# Patient Record
Sex: MALE | Race: WHITE | NOT HISPANIC OR LATINO | Employment: OTHER | URBAN - METROPOLITAN AREA
[De-identification: names, ages, dates, MRNs, and addresses within clinical notes are randomized per-mention and may not be internally consistent; named-entity substitution may affect disease eponyms.]

---

## 2018-12-11 ENCOUNTER — OFFICE VISIT (OUTPATIENT)
Dept: FAMILY MEDICINE | Facility: CLINIC | Age: 53
End: 2018-12-11
Payer: COMMERCIAL

## 2018-12-11 VITALS
HEART RATE: 60 BPM | WEIGHT: 199 LBS | SYSTOLIC BLOOD PRESSURE: 136 MMHG | TEMPERATURE: 98 F | HEIGHT: 69 IN | OXYGEN SATURATION: 98 % | BODY MASS INDEX: 29.47 KG/M2 | RESPIRATION RATE: 12 BRPM | DIASTOLIC BLOOD PRESSURE: 84 MMHG

## 2018-12-11 DIAGNOSIS — Z12.11 SCREEN FOR COLON CANCER: ICD-10-CM

## 2018-12-11 DIAGNOSIS — R59.0 LYMPHADENOPATHY OF RIGHT CERVICAL REGION: Primary | ICD-10-CM

## 2018-12-11 DIAGNOSIS — Z13.220 LIPID SCREENING: ICD-10-CM

## 2018-12-11 DIAGNOSIS — Z12.5 SCREENING FOR PROSTATE CANCER: ICD-10-CM

## 2018-12-11 DIAGNOSIS — M26.609 TMJ DYSFUNCTION: ICD-10-CM

## 2018-12-11 DIAGNOSIS — Z23 NEED FOR VACCINATION: ICD-10-CM

## 2018-12-11 PROCEDURE — 99214 OFFICE O/P EST MOD 30 MIN: CPT | Mod: ,,, | Performed by: INTERNAL MEDICINE

## 2018-12-11 PROCEDURE — 3008F BODY MASS INDEX DOCD: CPT | Mod: ,,, | Performed by: INTERNAL MEDICINE

## 2018-12-11 RX ORDER — CETIRIZINE HYDROCHLORIDE 10 MG/1
10 TABLET ORAL DAILY PRN
COMMUNITY

## 2018-12-11 RX ORDER — FINASTERIDE 1 MG/1
1 TABLET, FILM COATED ORAL DAILY
COMMUNITY
End: 2020-01-14

## 2018-12-11 NOTE — PROGRESS NOTES
SUBJECTIVE:    Patient ID: Emeetrio Nava is a 53 y.o. male.    Chief Complaint: Mass (right side of neck)    HPI     In for check-says he went hunting and developed some pain in front of his right ear, some ear pain, cracking in ear and jaw which has gone away-no sore throat-no ear sx now-continues with a lymph nose in the right neck-continues to be there-he noted it three weeks ago after his ear and jaw sx started-he noted jaw soreness which made it hard to chew and he would feel the jaw crack-he had no limitation of the mouth aperture-he had no fever-no radiation of pain at the time-he had no sx of anything catching in the laryngeal area-he has been having some sinus issues-sinus pressure headache frontally-had to lay down on a recent hunting trip-has regular intake of zyrtec helps to relieve headaches-if no zyrtec he will get a sinus headache    No visits with results within 6 Month(s) from this visit.   Latest known visit with results is:   Hospital Outpatient Visit on 07/12/2012   Component Date Value Ref Range Status    ISTAT CREATININE 07/12/2012 1.3  0.5 - 1.4 mg/dL Final       Past Medical History:   Diagnosis Date    Thyroid disease     cyst     Social History     Socioeconomic History    Marital status:      Spouse name: Not on file    Number of children: Not on file    Years of education: Not on file    Highest education level: Not on file   Social Needs    Financial resource strain: Not on file    Food insecurity - worry: Not on file    Food insecurity - inability: Not on file    Transportation needs - medical: Not on file    Transportation needs - non-medical: Not on file   Occupational History    Not on file   Tobacco Use    Smoking status: Never Smoker    Smokeless tobacco: Never Used   Substance and Sexual Activity    Alcohol use: Yes     Comment: social    Drug use: No    Sexual activity: Not on file   Other Topics Concern    Not on file   Social History Narrative     "Not on file     Past Surgical History:   Procedure Laterality Date    TONSILLECTOMY       Family History   Problem Relation Age of Onset    Heart disease Mother     Atrial fibrillation Mother     No Known Problems Sister     No Known Problems Brother      Vitals:    12/11/18 1025   BP: 136/84   Pulse: 60   Resp: 12   Temp: 97.8 °F (36.6 °C)   SpO2: 98%   Weight: 90.3 kg (199 lb)   Height: 5' 9" (1.753 m)       Review of Systems   Constitutional: Negative for appetite change, chills, diaphoresis, fatigue, fever and unexpected weight change.   HENT: Negative for congestion, ear pain, hearing loss, nosebleeds, postnasal drip, sinus pressure, sinus pain, sneezing, sore throat, tinnitus and trouble swallowing.         HPI   Eyes: Negative for photophobia, pain and visual disturbance.   Respiratory: Negative for apnea, cough, choking, chest tightness, shortness of breath and wheezing.         Snores   Cardiovascular: Negative for chest pain, palpitations (rarely notices them-checks his BP and it tells him the pulse is irregular at times) and leg swelling.        Evaluation last year-rx was recommended but he preferred not to take and try exercise and diet and sx decreased-excercises without palpitations   Gastrointestinal: Negative for abdominal distention, abdominal pain, blood in stool, constipation, diarrhea, nausea and vomiting.   Endocrine: Negative for cold intolerance, heat intolerance, polydipsia and polyuria.   Genitourinary: Negative for difficulty urinating, dysuria, flank pain, frequency, hematuria and urgency.   Musculoskeletal: Negative for arthralgias, back pain, joint swelling, myalgias, neck pain and neck stiffness.        HPI-no recent pain   Skin: Negative for pallor and rash.   Allergic/Immunologic: Negative for environmental allergies and food allergies.   Neurological: Negative for dizziness, tremors, seizures, syncope, speech difficulty, weakness, light-headedness, numbness and headaches. "   Hematological: Does not bruise/bleed easily.   Psychiatric/Behavioral: Negative for agitation, confusion, decreased concentration, sleep disturbance and suicidal ideas. The patient is not nervous/anxious.           Objective:      Physical Exam   Constitutional: He is oriented to person, place, and time. He appears well-developed and well-nourished. He is cooperative. No distress.   HENT:   Head: Normocephalic and atraumatic.   Nose: Nose normal.   Mouth/Throat: Uvula is midline, oropharynx is clear and moist and mucous membranes are normal.   1/2 cm lymph nose in the middle aspect of the right anterior cervical chain   Eyes: Conjunctivae and EOM are normal. Right pupil is round and reactive. Left pupil is round and reactive.   Neck: Normal range of motion. Neck supple. Carotid bruit is not present. No thyromegaly present.   Cardiovascular: Normal rate, regular rhythm, normal heart sounds and intact distal pulses.   Pulmonary/Chest: Effort normal and breath sounds normal.   Abdominal: Soft. Bowel sounds are normal. He exhibits no mass. There is no hepatosplenomegaly. There is no tenderness. There is no rigidity. No hernia.   Musculoskeletal: Normal range of motion.   Lymphadenopathy:     He has no cervical adenopathy.     He has no axillary adenopathy.   Neurological: He is alert and oriented to person, place, and time.   Skin: Skin is warm and dry. No cyanosis. Nails show no clubbing.   Psychiatric: He has a normal mood and affect. His speech is normal.   Nursing note and vitals reviewed.          Assessment:       1. Lymphadenopathy of right cervical region    2. TMJ dysfunction    3. Screen for colon cancer    4. Need for vaccination    5. Screening for prostate cancer    6. Lipid screening         Plan:           Lymphadenopathy of right cervical region  -     CBC auto differential; Future; Expected date: 12/11/2018  -     Comprehensive metabolic panel; Future; Expected date: 12/11/2018  -     If lymph nose  persists, recheck    TMJ dysfunction         -     Resolved    Screen for colon cancer  -     Cologuard Screening (Multitarget Stool DNA); Future; Expected date: 12/11/2018    Need for vaccination  -     DIPH,PERTUSS,ACEL,,TET VAC,PF,, ADULT, (ADACEL) 2 Lf-(2.5-5-3-5 mcg)-5Lf/0.5 mL injection; Inject 0.5 mLs into the muscle once. for 1 dose  Dispense: 0.5 mL; Refill: 0    Screening for prostate cancer  -     PSA, Screening; Future; Expected date: 12/11/2018    Lipid screening  -     Lipid panel; Future; Expected date: 12/11/2018

## 2019-01-12 LAB
ALBUMIN SERPL-MCNC: 4.3 G/DL (ref 3.5–5.5)
ALBUMIN/GLOB SERPL: 2 {RATIO} (ref 1.2–2.2)
ALP SERPL-CCNC: 50 IU/L (ref 39–117)
ALT SERPL-CCNC: 16 IU/L (ref 0–44)
AST SERPL-CCNC: 21 IU/L (ref 0–40)
BASOPHILS # BLD AUTO: 0 X10E3/UL (ref 0–0.2)
BASOPHILS NFR BLD AUTO: 1 %
BILIRUB SERPL-MCNC: 0.7 MG/DL (ref 0–1.2)
BUN SERPL-MCNC: 19 MG/DL (ref 6–24)
BUN/CREAT SERPL: 19 (ref 9–20)
CALCIUM SERPL-MCNC: 9.4 MG/DL (ref 8.7–10.2)
CHLORIDE SERPL-SCNC: 100 MMOL/L (ref 96–106)
CHOLEST SERPL-MCNC: 210 MG/DL (ref 100–199)
CO2 SERPL-SCNC: 26 MMOL/L (ref 20–29)
CREAT SERPL-MCNC: 0.99 MG/DL (ref 0.76–1.27)
EGFR IF AFRICAN AMERICAN: 100 ML/MIN/1.73
EOSINOPHIL # BLD AUTO: 0.2 X10E3/UL (ref 0–0.4)
EOSINOPHIL NFR BLD AUTO: 3 %
ERYTHROCYTE [DISTWIDTH] IN BLOOD BY AUTOMATED COUNT: 13.7 % (ref 12.3–15.4)
EST. GFR  (NON AFRICAN AMERICAN): 87 ML/MIN/1.73
GLOBULIN SER CALC-MCNC: 2.2 G/DL (ref 1.5–4.5)
GLUCOSE SERPL-MCNC: 90 MG/DL (ref 65–99)
HCT VFR BLD AUTO: 45.1 % (ref 37.5–51)
HDLC SERPL-MCNC: 60 MG/DL
HGB BLD-MCNC: 14.6 G/DL (ref 13–17.7)
IMM GRANULOCYTES # BLD AUTO: 0 X10E3/UL (ref 0–0.1)
IMM GRANULOCYTES NFR BLD AUTO: 0 %
LDLC SERPL CALC-MCNC: 136 MG/DL (ref 0–99)
LYMPHOCYTES # BLD AUTO: 1.5 X10E3/UL (ref 0.7–3.1)
LYMPHOCYTES NFR BLD AUTO: 25 %
MCH RBC QN AUTO: 29 PG (ref 26.6–33)
MCHC RBC AUTO-ENTMCNC: 32.4 G/DL (ref 31.5–35.7)
MCV RBC AUTO: 90 FL (ref 79–97)
MONOCYTES # BLD AUTO: 0.8 X10E3/UL (ref 0.1–0.9)
MONOCYTES NFR BLD AUTO: 13 %
NEUTROPHILS # BLD AUTO: 3.5 X10E3/UL (ref 1.4–7)
NEUTROPHILS NFR BLD AUTO: 58 %
PLATELET # BLD AUTO: 257 X10E3/UL (ref 150–379)
POTASSIUM SERPL-SCNC: 4.9 MMOL/L (ref 3.5–5.2)
PROT SERPL-MCNC: 6.5 G/DL (ref 6–8.5)
PSA SERPL-MCNC: 0.4 NG/ML (ref 0–4)
RBC # BLD AUTO: 5.04 X10E6/UL (ref 4.14–5.8)
SODIUM SERPL-SCNC: 140 MMOL/L (ref 134–144)
TRIGL SERPL-MCNC: 71 MG/DL (ref 0–149)
VLDLC SERPL CALC-MCNC: 14 MG/DL (ref 5–40)
WBC # BLD AUTO: 5.9 X10E3/UL (ref 3.4–10.8)

## 2019-01-14 ENCOUNTER — TELEPHONE (OUTPATIENT)
Dept: FAMILY MEDICINE | Facility: CLINIC | Age: 54
End: 2019-01-14

## 2019-01-14 DIAGNOSIS — E78.00 PURE HYPERCHOLESTEROLEMIA: Primary | ICD-10-CM

## 2019-01-14 DIAGNOSIS — E78.00 ELEVATED LDL CHOLESTEROL LEVEL: Primary | ICD-10-CM

## 2019-01-14 RX ORDER — ATORVASTATIN CALCIUM 20 MG/1
20 TABLET, FILM COATED ORAL DAILY
Qty: 90 TABLET | Refills: 1 | Status: SHIPPED | OUTPATIENT
Start: 2019-01-14 | End: 2019-07-10

## 2019-01-14 NOTE — TELEPHONE ENCOUNTER
----- Message from Katharina Marcum MD sent at 1/14/2019 11:03 AM CST -----  Please call the patient regarding his abnormal result.-labs good except LDL whaivh is elevated-needs rx -other labs normal-recheck lipids and LFS 6 months-low fat diet -give copy

## 2019-07-10 ENCOUNTER — OFFICE VISIT (OUTPATIENT)
Dept: FAMILY MEDICINE | Facility: CLINIC | Age: 54
End: 2019-07-10
Payer: COMMERCIAL

## 2019-07-10 VITALS
SYSTOLIC BLOOD PRESSURE: 132 MMHG | HEART RATE: 58 BPM | DIASTOLIC BLOOD PRESSURE: 84 MMHG | WEIGHT: 191 LBS | TEMPERATURE: 98 F | RESPIRATION RATE: 12 BRPM | BODY MASS INDEX: 28.29 KG/M2 | HEIGHT: 69 IN | OXYGEN SATURATION: 97 %

## 2019-07-10 DIAGNOSIS — E78.00 PURE HYPERCHOLESTEROLEMIA: ICD-10-CM

## 2019-07-10 DIAGNOSIS — R59.1 LYMPHADENOPATHY: ICD-10-CM

## 2019-07-10 PROCEDURE — 99213 PR OFFICE/OUTPT VISIT, EST, LEVL III, 20-29 MIN: ICD-10-PCS | Mod: ,,, | Performed by: INTERNAL MEDICINE

## 2019-07-10 PROCEDURE — 99213 OFFICE O/P EST LOW 20 MIN: CPT | Mod: ,,, | Performed by: INTERNAL MEDICINE

## 2019-07-10 PROCEDURE — 3008F BODY MASS INDEX DOCD: CPT | Mod: ,,, | Performed by: INTERNAL MEDICINE

## 2019-07-10 PROCEDURE — 3008F PR BODY MASS INDEX (BMI) DOCUMENTED: ICD-10-PCS | Mod: ,,, | Performed by: INTERNAL MEDICINE

## 2019-07-10 NOTE — PROGRESS NOTES
SUBJECTIVE:    Patient ID: Emeterio Nava is a 54 y.o. male.    Chief Complaint: Adenopathy and Follow-up    HPI     Follow up on lymph node upper right neck submaxillary area felt last exam-pt does not feel it has changed-maybe a little smaller-    Lipids-he chose to bring his  down by diet and exercise-has lost 20 plus pounds -regularly attending cross-fit type classes    Orders Only on 01/11/2019   Component Date Value Ref Range Status    WBC 01/11/2019 5.9  3.4 - 10.8 x10E3/uL Final    RBC 01/11/2019 5.04  4.14 - 5.80 x10E6/uL Final    Hemoglobin 01/11/2019 14.6  13.0 - 17.7 g/dL Final    Hematocrit 01/11/2019 45.1  37.5 - 51.0 % Final    Mean Corpuscular Volume 01/11/2019 90  79 - 97 fL Final    Mean Corpuscular Hemoglobin 01/11/2019 29.0  26.6 - 33.0 pg Final    Mean Corpuscular Hemoglobin Conc 01/11/2019 32.4  31.5 - 35.7 g/dL Final    RDW 01/11/2019 13.7  12.3 - 15.4 % Final    Platelets 01/11/2019 257  150 - 379 x10E3/uL Final    Neutrophils 01/11/2019 58  Not Estab. % Final    Lymph% 01/11/2019 25  Not Estab. % Final    Mono% 01/11/2019 13  Not Estab. % Final    Eosinophil% 01/11/2019 3  Not Estab. % Final    Basophil% 01/11/2019 1  Not Estab. % Final    Neutrophils Absolute 01/11/2019 3.5  1.4 - 7.0 x10E3/uL Final    Lymph # 01/11/2019 1.5  0.7 - 3.1 x10E3/uL Final    Mono # 01/11/2019 0.8  0.1 - 0.9 x10E3/uL Final    Eos # 01/11/2019 0.2  0.0 - 0.4 x10E3/uL Final    Baso # 01/11/2019 0.0  0.0 - 0.2 x10E3/uL Final    Immature Granulocytes 01/11/2019 0  Not Estab. % Final    Immature Grans (Abs) 01/11/2019 0.0  0.0 - 0.1 x10E3/uL Final    Glucose 01/11/2019 90  65 - 99 mg/dL Final    BUN, Bld 01/11/2019 19  6 - 24 mg/dL Final    Creatinine 01/11/2019 0.99  0.76 - 1.27 mg/dL Final    eGFR if non African American 01/11/2019 87  >59 mL/min/1.73 Final    eGFR if African American 01/11/2019 100  >59 mL/min/1.73 Final    BUN/Creatinine Ratio 01/11/2019 19  9 - 20 Final     Sodium 01/11/2019 140  134 - 144 mmol/L Final    Potassium 01/11/2019 4.9  3.5 - 5.2 mmol/L Final    Chloride 01/11/2019 100  96 - 106 mmol/L Final    CO2 01/11/2019 26  20 - 29 mmol/L Final    Calcium 01/11/2019 9.4  8.7 - 10.2 mg/dL Final    Total Protein 01/11/2019 6.5  6.0 - 8.5 g/dL Final    Albumin 01/11/2019 4.3  3.5 - 5.5 g/dL Final    Globulin, Total 01/11/2019 2.2  1.5 - 4.5 g/dL Final    Albumin/Globulin Ratio 01/11/2019 2.0  1.2 - 2.2 Final    Total Bilirubin 01/11/2019 0.7  0.0 - 1.2 mg/dL Final    Alkaline Phosphatase 01/11/2019 50  39 - 117 IU/L Final    AST 01/11/2019 21  0 - 40 IU/L Final    ALT 01/11/2019 16  0 - 44 IU/L Final    Cholesterol 01/11/2019 210* 100 - 199 mg/dL Final    Triglycerides 01/11/2019 71  0 - 149 mg/dL Final    HDL 01/11/2019 60  >39 mg/dL Final    VLDL Cholesterol Gene 01/11/2019 14  5 - 40 mg/dL Final    LDL Calculated 01/11/2019 136* 0 - 99 mg/dL Final    PSA - [facility] 01/11/2019 0.4  0.0 - 4.0 ng/mL Final       Past Medical History:   Diagnosis Date    Thyroid disease     cyst     Past Surgical History:   Procedure Laterality Date    TONSILLECTOMY       Family History   Problem Relation Age of Onset    Heart disease Mother     Atrial fibrillation Mother     No Known Problems Sister     No Known Problems Brother        Marital Status:   Alcohol History:  reports that he drinks alcohol.  Tobacco History:  reports that he has never smoked. He has never used smokeless tobacco.  Drug History:  reports that he does not use drugs.    Review of patient's allergies indicates:  No Known Allergies    Current Outpatient Medications:     cetirizine (ZYRTEC) 10 MG tablet, Take 10 mg by mouth once daily., Disp: , Rfl:     finasteride (PROPECIA) 1 mg tablet, Take 1 mg by mouth once daily., Disp: , Rfl:     Review of Systems   All other systems reviewed and are negative.         Objective:      Vitals:    07/10/19 1018   BP: 132/84   Pulse: (!) 58   Resp: 12  "  Temp: 98.3 °F (36.8 °C)   SpO2: 97%   Weight: 86.6 kg (191 lb)   Height: 5' 9" (1.753 m)     Physical Exam   Constitutional: He is oriented to person, place, and time. He appears well-developed and well-nourished. He is cooperative.   HENT:   One cm soft node submaxillary area a little smaller than prior-   Eyes: Pupils are equal, round, and reactive to light. Conjunctivae, EOM and lids are normal. Right pupil is round and reactive. Left pupil is round and reactive.   Neck: Normal range of motion. Neck supple. No JVD present. Carotid bruit is not present. No thyromegaly present.   Cardiovascular: Normal rate, regular rhythm, normal heart sounds and intact distal pulses. Exam reveals no gallop and no friction rub.   No murmur heard.  Pulmonary/Chest: Breath sounds normal. No respiratory distress. He has no wheezes. He has no rales.   Abdominal: Soft. Bowel sounds are normal. He exhibits no mass. There is no tenderness. There is no rigidity and no guarding.   Musculoskeletal: He exhibits no edema.   Neurological: He is alert and oriented to person, place, and time.   Skin: Skin is warm and dry. Capillary refill takes less than 2 seconds. No lesion and no rash noted. Nails show no clubbing.   Psychiatric: He has a normal mood and affect. His behavior is normal. Judgment and thought content normal.   Nursing note and vitals reviewed.        Assessment:       1. Lymphadenopathy    2. Pure hypercholesterolemia         Plan:       Lymphadenopathy  -     CBC auto differential; Future; Expected date: 07/17/2019  -     Ambulatory referral to ENT    Pure hypercholesterolemia  -     Lipid panel; Future; Expected date: 07/10/2019  -     Hepatic function panel; Future; Expected date: 07/10/2019      No follow-ups on file.        7/13/2019 Katharina Marcum M.D.    "

## 2020-01-08 LAB
ALBUMIN SERPL-MCNC: 4.3 G/DL (ref 3.6–5.1)
ALBUMIN/GLOB SERPL: 2 (CALC) (ref 1–2.5)
ALP SERPL-CCNC: 56 U/L (ref 40–115)
ALT SERPL-CCNC: 14 U/L (ref 9–46)
AST SERPL-CCNC: 17 U/L (ref 10–35)
BASOPHILS # BLD AUTO: 50 CELLS/UL (ref 0–200)
BASOPHILS NFR BLD AUTO: 0.8 %
BILIRUB DIRECT SERPL-MCNC: 0.1 MG/DL
BILIRUB INDIRECT SERPL-MCNC: 0.8 MG/DL (CALC) (ref 0.2–1.2)
BILIRUB SERPL-MCNC: 0.9 MG/DL (ref 0.2–1.2)
CHOLEST SERPL-MCNC: 211 MG/DL
CHOLEST/HDLC SERPL: 3.5 (CALC)
EOSINOPHIL # BLD AUTO: 136 CELLS/UL (ref 15–500)
EOSINOPHIL NFR BLD AUTO: 2.2 %
ERYTHROCYTE [DISTWIDTH] IN BLOOD BY AUTOMATED COUNT: 13 % (ref 11–15)
GLOBULIN SER CALC-MCNC: 2.1 G/DL (CALC) (ref 1.9–3.7)
HCT VFR BLD AUTO: 44 % (ref 38.5–50)
HDLC SERPL-MCNC: 61 MG/DL
HGB BLD-MCNC: 14.5 G/DL (ref 13.2–17.1)
LDLC SERPL CALC-MCNC: 135 MG/DL (CALC)
LYMPHOCYTES # BLD AUTO: 1314 CELLS/UL (ref 850–3900)
LYMPHOCYTES NFR BLD AUTO: 21.2 %
MCH RBC QN AUTO: 29.2 PG (ref 27–33)
MCHC RBC AUTO-ENTMCNC: 33 G/DL (ref 32–36)
MCV RBC AUTO: 88.7 FL (ref 80–100)
MONOCYTES # BLD AUTO: 725 CELLS/UL (ref 200–950)
MONOCYTES NFR BLD AUTO: 11.7 %
NEUTROPHILS # BLD AUTO: 3974 CELLS/UL (ref 1500–7800)
NEUTROPHILS NFR BLD AUTO: 64.1 %
NONHDLC SERPL-MCNC: 150 MG/DL (CALC)
PLATELET # BLD AUTO: 299 THOUSAND/UL (ref 140–400)
PMV BLD REES-ECKER: 10.3 FL (ref 7.5–12.5)
PROT SERPL-MCNC: 6.4 G/DL (ref 6.1–8.1)
RBC # BLD AUTO: 4.96 MILLION/UL (ref 4.2–5.8)
TRIGL SERPL-MCNC: 56 MG/DL
WBC # BLD AUTO: 6.2 THOUSAND/UL (ref 3.8–10.8)

## 2020-01-10 ENCOUNTER — TELEPHONE (OUTPATIENT)
Dept: FAMILY MEDICINE | Facility: CLINIC | Age: 55
End: 2020-01-10

## 2020-01-10 NOTE — TELEPHONE ENCOUNTER
----- Message from Katharina Marcum MD sent at 1/8/2020  8:40 AM CST -----  Please call the patient regarding his abnormal result.LDL elevated-needs lipitor 20 mg and recheck labs in 6 months-low fat diet.....

## 2020-01-14 ENCOUNTER — OFFICE VISIT (OUTPATIENT)
Dept: FAMILY MEDICINE | Facility: CLINIC | Age: 55
End: 2020-01-14
Payer: COMMERCIAL

## 2020-01-14 VITALS
DIASTOLIC BLOOD PRESSURE: 74 MMHG | HEART RATE: 66 BPM | SYSTOLIC BLOOD PRESSURE: 132 MMHG | BODY MASS INDEX: 28.88 KG/M2 | OXYGEN SATURATION: 98 % | HEIGHT: 69 IN | TEMPERATURE: 98 F | RESPIRATION RATE: 16 BRPM | WEIGHT: 195 LBS

## 2020-01-14 DIAGNOSIS — Z12.5 SPECIAL SCREENING, PROSTATE CANCER: ICD-10-CM

## 2020-01-14 DIAGNOSIS — E78.00 PURE HYPERCHOLESTEROLEMIA: ICD-10-CM

## 2020-01-14 DIAGNOSIS — E78.00 PURE HYPERCHOLESTEROLEMIA: Primary | ICD-10-CM

## 2020-01-14 DIAGNOSIS — Z00.00 WELLNESS EXAMINATION: Primary | ICD-10-CM

## 2020-01-14 DIAGNOSIS — Z23 NEED FOR VACCINE FOR TD (TETANUS-DIPHTHERIA): ICD-10-CM

## 2020-01-14 PROBLEM — R59.1 LYMPHADENOPATHY: Status: RESOLVED | Noted: 2019-07-10 | Resolved: 2020-01-14

## 2020-01-14 PROCEDURE — 90715 TDAP VACCINE GREATER THAN OR EQUAL TO 7YO IM: ICD-10-PCS | Mod: S$GLB,,, | Performed by: INTERNAL MEDICINE

## 2020-01-14 PROCEDURE — 90471 IMMUNIZATION ADMIN: CPT | Mod: S$GLB,,, | Performed by: INTERNAL MEDICINE

## 2020-01-14 PROCEDURE — 99396 PR PREVENTIVE VISIT,EST,40-64: ICD-10-PCS | Mod: 25,S$GLB,, | Performed by: INTERNAL MEDICINE

## 2020-01-14 PROCEDURE — 90471 TDAP VACCINE GREATER THAN OR EQUAL TO 7YO IM: ICD-10-PCS | Mod: S$GLB,,, | Performed by: INTERNAL MEDICINE

## 2020-01-14 PROCEDURE — 90715 TDAP VACCINE 7 YRS/> IM: CPT | Mod: S$GLB,,, | Performed by: INTERNAL MEDICINE

## 2020-01-14 PROCEDURE — 99396 PREV VISIT EST AGE 40-64: CPT | Mod: 25,S$GLB,, | Performed by: INTERNAL MEDICINE

## 2020-01-14 RX ORDER — ATORVASTATIN CALCIUM 20 MG/1
20 TABLET, FILM COATED ORAL DAILY
Qty: 90 TABLET | Refills: 1 | Status: SHIPPED | OUTPATIENT
Start: 2020-01-14 | End: 2020-07-14

## 2020-01-14 RX ORDER — ATORVASTATIN CALCIUM 20 MG/1
20 TABLET, FILM COATED ORAL DAILY
Qty: 90 TABLET | Refills: 1 | Status: SHIPPED | OUTPATIENT
Start: 2020-01-14 | End: 2020-01-14

## 2020-01-14 NOTE — PROGRESS NOTES
SUBJECTIVE:    Patient ID: Emeterio Nava is a 54 y.o. male.    Chief Complaint: Hyperlipidemia and Follow-up    HPI     Patient comes in for recheck of laboratory studies.    His cholesterol was 211, with HDL of 61 and LDL of 135.  The triglycerides of 56, HDL 61.    Orders Only on 01/07/2020   Component Date Value Ref Range Status    Cholesterol 01/07/2020 211* <200 mg/dL Final    HDL 01/07/2020 61  >40 mg/dL Final    Triglycerides 01/07/2020 56  <150 mg/dL Final    LDL Cholesterol 01/07/2020 135* mg/dL (calc) Final    Hdl/Cholesterol Ratio 01/07/2020 3.5  <5.0 (calc) Final    Non HDL Chol. (LDL+VLDL) 01/07/2020 150* <130 mg/dL (calc) Final    Total Protein 01/07/2020 6.4  6.1 - 8.1 g/dL Final    Albumin 01/07/2020 4.3  3.6 - 5.1 g/dL Final    Globulin, Total 01/07/2020 2.1  1.9 - 3.7 g/dL (calc) Final    Albumin/Globulin Ratio 01/07/2020 2.0  1.0 - 2.5 (calc) Final    Total Bilirubin 01/07/2020 0.9  0.2 - 1.2 mg/dL Final    Bilirubin, Direct 01/07/2020 0.1  < OR = 0.2 mg/dL Final    Bilirubin, Indirect 01/07/2020 0.8  0.2 - 1.2 mg/dL (calc) Final    Alkaline Phosphatase 01/07/2020 56  40 - 115 U/L Final    AST 01/07/2020 17  10 - 35 U/L Final    ALT 01/07/2020 14  9 - 46 U/L Final    WBC 01/07/2020 6.2  3.8 - 10.8 Thousand/uL Final    RBC 01/07/2020 4.96  4.20 - 5.80 Million/uL Final    Hemoglobin 01/07/2020 14.5  13.2 - 17.1 g/dL Final    Hematocrit 01/07/2020 44.0  38.5 - 50.0 % Final    Mean Corpuscular Volume 01/07/2020 88.7  80.0 - 100.0 fL Final    Mean Corpuscular Hemoglobin 01/07/2020 29.2  27.0 - 33.0 pg Final    Mean Corpuscular Hemoglobin Conc 01/07/2020 33.0  32.0 - 36.0 g/dL Final    RDW 01/07/2020 13.0  11.0 - 15.0 % Final    Platelets 01/07/2020 299  140 - 400 Thousand/uL Final    MPV 01/07/2020 10.3  7.5 - 12.5 fL Final    Neutrophils Absolute 01/07/2020 3,974  1,500 - 7,800 cells/uL Final    Lymph # 01/07/2020 1,314  850 - 3,900 cells/uL Final    Mono # 01/07/2020  725  200 - 950 cells/uL Final    Eos # 01/07/2020 136  15 - 500 cells/uL Final    Baso # 01/07/2020 50  0 - 200 cells/uL Final    Neutrophils Relative 01/07/2020 64.1  % Final    Lymph% 01/07/2020 21.2  % Final    Mono% 01/07/2020 11.7  % Final    Eosinophil% 01/07/2020 2.2  % Final    Basophil% 01/07/2020 0.8  % Final       Past Medical History:   Diagnosis Date    Lymphadenopathy 7/10/2019    Thyroid disease     cyst     Past Surgical History:   Procedure Laterality Date    TONSILLECTOMY       Family History   Problem Relation Age of Onset    Heart disease Mother     Atrial fibrillation Mother     No Known Problems Sister     No Known Problems Brother        Marital Status:   Alcohol History:  reports that he drinks alcohol.  Tobacco History:  reports that he has never smoked. He has never used smokeless tobacco.  Drug History:  reports that he does not use drugs.    Review of patient's allergies indicates:  No Known Allergies    Current Outpatient Medications:     cetirizine (ZYRTEC) 10 MG tablet, Take 10 mg by mouth once daily., Disp: , Rfl:     atorvastatin (LIPITOR) 20 MG tablet, Take 1 tablet (20 mg total) by mouth once daily., Disp: 90 tablet, Rfl: 1    Review of Systems   Constitutional: Negative for chills, fatigue, fever and unexpected weight change.   HENT: Negative for congestion, ear pain, hearing loss, sinus pain and sore throat.    Eyes: Negative for pain and visual disturbance.   Respiratory: Negative for cough, shortness of breath and wheezing.    Cardiovascular: Negative for chest pain, palpitations and leg swelling.   Gastrointestinal: Negative for abdominal pain, blood in stool, constipation, diarrhea, nausea and vomiting.   Endocrine: Negative for cold intolerance and heat intolerance.   Genitourinary: Negative for difficulty urinating, dysuria, frequency, testicular pain and urgency.   Musculoskeletal: Negative for back pain, joint swelling and neck pain.   Skin:  "Negative for pallor and rash.   Neurological: Negative for dizziness, tremors, weakness, numbness and headaches.   Hematological: Does not bruise/bleed easily.   Psychiatric/Behavioral: Negative for agitation, sleep disturbance and suicidal ideas.          Objective:      Vitals:    01/14/20 0911   BP: 132/74   Pulse: 66   Resp: 16   Temp: 98.1 °F (36.7 °C)   SpO2: 98%   Weight: 88.5 kg (195 lb)   Height: 5' 9" (1.753 m)     Physical Exam   Constitutional: He is oriented to person, place, and time. He appears well-developed and well-nourished. He is cooperative.   Eyes: Pupils are equal, round, and reactive to light. Conjunctivae, EOM and lids are normal. Right pupil is round and reactive. Left pupil is round and reactive.   Neck: Normal range of motion. Neck supple. No JVD present. Carotid bruit is not present. No thyromegaly present.   Cardiovascular: Normal rate, regular rhythm, normal heart sounds and intact distal pulses. Exam reveals no gallop and no friction rub.   No murmur heard.  Pulmonary/Chest: Breath sounds normal. No respiratory distress. He has no wheezes. He has no rales.   Abdominal: Soft. Bowel sounds are normal. He exhibits no mass. There is no tenderness. There is no rigidity and no guarding.   Genitourinary: Prostate normal.   Musculoskeletal: He exhibits no edema.   Neurological: He is alert and oriented to person, place, and time.   Skin: Skin is warm and dry. Capillary refill takes less than 2 seconds. No lesion and no rash noted. Nails show no clubbing.   Psychiatric: He has a normal mood and affect. His behavior is normal. Judgment and thought content normal.   Nursing note and vitals reviewed.        Assessment:       1. Wellness examination    2. Pure hypercholesterolemia    3. Special screening, prostate cancer    4. Need for vaccine for Td (tetanus-diphtheria)         Plan:       Wellness examination  -     CBC auto differential; Future; Expected date: 01/14/2020  -     Comprehensive " metabolic panel; Future; Expected date: 01/14/2020  -     Lipid panel; Future; Expected date: 01/14/2020  -     Urinalysis; Future; Expected date: 01/14/2020    Special screening, prostate cancer  -     PSA, Screening; Future; Expected date: 07/12/2020    Need for vaccine for Td (tetanus-diphtheria)  -     Tdap Vaccine          No follow-ups on file.        1/14/2020 Katharina Marcum M.D.

## 2020-01-14 NOTE — PATIENT INSTRUCTIONS
Low-Cholesterol Diet  Your body needs cholesterol to build new cells and create certain hormones. There are 2 kinds of cholesterol in your blood:     · HDL (good) cholesterol. This prevents fat deposits (plaque) from building up in your arteries. In this way it protects against heart disease and stroke.  · LDL (bad) cholesterol. This stays in your body and sticks to artery walls. Over time it may block blood flow to the heart and brain. This can cause a heart attack or stroke.  The cholesterol in your blood comes from 2 sources: cholesterol in food that you eat and cholesterol that your liver makes. You should limit the amount of cholesterol in your diet. But the cholesterol that your body makes has the greatest disease risk. And your body makes more cholesterol when your diet is high in bad fats (saturated and trans fats). There are 2 kinds of fats you can eat:  · Good fats, or unsaturated fats (mono-unsaturated and poly-unsaturated). They raise the level of good cholesterol and lower the level of bad cholesterol. Good fats are found in vegetable oils such as olive, sunflower, corn, and soybean oils, and in nuts and seeds.  · Bad fats, or saturated fats (including foods high in cholesterol) and trans fats. These raise your risk of disease. They lower the good cholesterol and raise the level of bad cholesterol. Bad fats are found in animal products, including meat, whole-milk dairy products, and butter. Some plants are also high in bad fats (coconut and palm plants). Trans fats are found in hard (stick) margarines. They are also in many fast foods and commercially baked goods. Soft margarine sold in tubs has fewer trans fats and is safer to use.  High blood cholesterol is usually due to a diet high in saturated fat, along with not being physically active. In some cases, genetics plays a role in causing high cholesterol. The tips below will help you create healthy eating habits that will help lower your blood  cholesterol level.  Create a diet high in good fats, low in bad fats (and low in cholesterol)  The following steps will help you create a diet high in good fats and low in bad fats:  · Talk with your doctor before starting a low cholesterol diet or weight loss program.  · Learn to read nutrition labels and select appropriate portion sizes.  · When cooking, use plant-based unsaturated vegetable oils (sunflower, corn, soybean, canola, peanut, and olive oils).  · Avoid saturated fats found in animal products such as meat, dairy (whole-milk, cheese and ice cream), poultry skin, and egg yolks. Plants high in saturated oils include coconut oil, palm oil, and palm kernel oil.  · If you eat meat, choose smaller portions and lean cuts, such as round, nohelia, sirloin, or loin. Eat more meatless meals.  · Replace meat with fish at least 2 times a week. Fish is an important source of the unsaturated fat called omega-3 fatty acids. This fat has potential to lower the risk of heart disease.  · Replace whole-milk dairy products with low-fat or nonfat products. Try soy products. Soy helps to reduce total cholesterol.  · Supplement your diet with protective fibers. Eat nuts, seeds, and whole grains rather than white rice and bread. These foods lower both cholesterol and triglyceride levels. (Triglycerides are another fat found in the blood.) Walnuts are one of the best sources of omega-3 fatty acids.  · Eat plenty of fresh fruits and vegetables daily.  · Avoid fast foods and commercial baked goods. Assume they contain saturated fat unless labeled otherwise.  Date Last Reviewed: 8/1/2016  © 2243-6409 [employer]. [address] [place] [address], [place] [address]. All rights reserved. This information is not intended as a substitute for professional medical care. Always follow your healthcare professional's instructions.        Atorvastatin tablets  What is this medicine?  ATORVASTATIN (a TORE va sta tin) is known as a HMG-CoA  reductase inhibitor or 'statin'. It lowers the level of cholesterol and triglycerides in the blood. This drug may also reduce the risk of heart attack, stroke, or other health problems in patients with risk factors for heart disease. Diet and lifestyle changes are often used with this drug.  How should I use this medicine?  Take this medicine by mouth with a glass of water. Follow the directions on the prescription label. You can take this medicine with or without food. Take your doses at regular intervals. Do not take your medicine more often than directed.  Talk to your pediatrician regarding the use of this medicine in children. While this drug may be prescribed for children as young as 10 years old for selected conditions, precautions do apply.  What side effects may I notice from receiving this medicine?  Side effects that you should report to your doctor or health care professional as soon as possible:  · allergic reactions like skin rash, itching or hives, swelling of the face, lips, or tongue  · dark urine  · fever  · joint pain  · muscle cramps, pain  · redness, blistering, peeling or loosening of the skin, including inside the mouth  · trouble passing urine or change in the amount of urine  · unusually weak or tired  · yellowing of eyes or skin  Side effects that usually do not require medical attention (report to your doctor or health care professional if they continue or are bothersome):  · constipation  · heartburn  · stomach gas, pain, upset  What may interact with this medicine?  Do not take this medicine with any of the following medications:  · red yeast rice  · telaprevir  · telithromycin  · voriconazole  This medicine may also interact with the following medications:  · alcohol  · antiviral medicines for HIV or AIDS  · boceprevir  · certain antibiotics like clarithromycin, erythromycin, troleandomycin  · certain medicines for cholesterol like fenofibrate or  gemfibrozil  · cimetidine  · clarithromycin  · colchicine  · cyclosporine  · digoxin  · female hormones, like estrogens or progestins and birth control pills  · grapefruit juice  · medicines for fungal infections like fluconazole, itraconazole, ketoconazole  · niacin  · rifampin  · spironolactone  What if I miss a dose?  If you miss a dose, take it as soon as you can. If it is almost time for your next dose, take only that dose. Do not take double or extra doses.  Where should I keep my medicine?  Keep out of the reach of children.  Store at room temperature between 20 to 25 degrees C (68 to 77 degrees F). Throw away any unused medicine after the expiration date.  What should I tell my health care provider before I take this medicine?  They need to know if you have any of these conditions:  · frequently drink alcoholic beverages  · history of stroke, TIA  · kidney disease  · liver disease  · muscle aches or weakness  · other medical condition  · an unusual or allergic reaction to atorvastatin, other medicines, foods, dyes, or preservatives  · pregnant or trying to get pregnant  · breast-feeding  What should I watch for while using this medicine?  Visit your doctor or health care professional for regular check-ups. You may need regular tests to make sure your liver is working properly.  Tell your doctor or health care professional right away if you get any unexplained muscle pain, tenderness, or weakness, especially if you also have a fever and tiredness. Your doctor or health care professional may tell you to stop taking this medicine if you develop muscle problems. If your muscle problems do not go away after stopping this medicine, contact your health care professional.  This drug is only part of a total heart-health program. Your doctor or a dietician can suggest a low-cholesterol and low-fat diet to help. Avoid alcohol and smoking, and keep a proper exercise schedule.  Do not use this drug if you are pregnant or  breast-feeding. Serious side effects to an unborn child or to an infant are possible. Talk to your doctor or pharmacist for more information.  This medicine may affect blood sugar levels. If you have diabetes, check with your doctor or health care professional before you change your diet or the dose of your diabetic medicine.  If you are going to have surgery tell your health care professional that you are taking this drug.  NOTE:This sheet is a summary. It may not cover all possible information. If you have questions about this medicine, talk to your doctor, pharmacist, or health care provider. Copyright© 2017 [employer]

## 2020-02-18 ENCOUNTER — TELEPHONE (OUTPATIENT)
Dept: FAMILY MEDICINE | Facility: CLINIC | Age: 55
End: 2020-02-18

## 2020-02-18 NOTE — TELEPHONE ENCOUNTER
Pt having under eyelid surgery on 4-8-2020 needs clearance letter.    Dr. Aditya pedraza  P# [phone]  F# [phone]    Attn: ced

## 2020-03-25 ENCOUNTER — NURSE TRIAGE (OUTPATIENT)
Dept: ADMINISTRATIVE | Facility: CLINIC | Age: 55
End: 2020-03-25

## 2020-03-25 NOTE — TELEPHONE ENCOUNTER
Pt experienced a cough with congestion with decreased taste and smell since 2 weeks ago (3/10). Cough is productive with light green sputum about the size of a quarter that clears up during the day. Symptoms have been improving with hydration and tylenol. Patient denied fever, dyspnea, hemoptysis, abdo symptoms and pain.     Pt advised for home care with nasal decongestant, tylenol, and cough drops as needed and to call back if symptoms worsen or new symptoms appear. Pt verbalizes understanding and will follow advise.       Reason for Disposition   Cough with cold symptoms (e.g., runny nose, postnasal drip, throat clearing)    Additional Information   Negative: Bluish (or gray) lips or face   Negative: Severe difficulty breathing (e.g., struggling for each breath, speaks in single words)   Negative: Rapid onset of cough and has hives   Negative: Coughing started suddenly after medicine, an allergic food or bee sting   Negative: Difficulty breathing after exposure to flames, smoke, or fumes   Negative: Sounds like a life-threatening emergency to the triager   Negative: Previous asthma attacks and this feels like asthma attack   Negative: Chest pain present when not coughing   Negative: Difficulty breathing   Negative: Passed out (i.e., fainted, collapsed and was not responding)   Negative: Patient sounds very sick or weak to the triager   Negative: Coughed up > 1 tablespoon (15 ml) blood (Exception: blood-tinged sputum)   Negative: Fever > 103 F (39.4 C)   Negative: Fever > 101 F (38.3 C) and over 60 years of age   Negative: Fever > 100.0 F (37.8 C) and has diabetes mellitus or a weak immune system (e.g., HIV positive, cancer chemotherapy, organ transplant, splenectomy, chronic steroids)   Negative: Fever > 100.0 F (37.8 C) and bedridden (e.g., nursing home patient, stroke, chronic illness, recovering from surgery)   Negative: Increasing ankle swelling   Negative: Wheezing is present   Negative:  SEVERE coughing spells (e.g., whooping sound after coughing, vomiting after coughing)   Negative: Coughing up sherron-colored (reddish-brown) or blood-tinged sputum   Negative: Fever present > 3 days (72 hours)   Negative: Fever returns after gone for over 24 hours and symptoms worse or not improved   Negative: Using nasal washes and pain medicine > 24 hours and sinus pain persists   Negative: Known COPD or other severe lung disease (i.e., bronchiectasis, cystic fibrosis, lung surgery) and worsening symptoms (i.e., increased sputum purulence or amount, increased breathing difficulty)   Negative: Continuous (nonstop) coughing interferes with work or school and no improvement using cough treatment per Care Advice   Negative: Patient wants to be seen   Negative: Cough has been present for > 3 weeks   Negative: Allergy symptoms are also present (e.g., itchy eyes, clear nasal discharge, postnasal drip)   Negative: Nasal discharge present > 10 days   Negative: Exposure to TB (Tuberculosis)   Negative: Taking an ACE Inhibitor medication (e.g., benazepril/LOTENSIN, captopril/CAPOTEN, enalapril/VASOTEC, lisinopril/ZESTRIL)    Protocols used: COUGH-A-OH

## 2020-07-14 ENCOUNTER — OFFICE VISIT (OUTPATIENT)
Dept: FAMILY MEDICINE | Facility: CLINIC | Age: 55
End: 2020-07-14
Payer: COMMERCIAL

## 2020-07-14 VITALS
SYSTOLIC BLOOD PRESSURE: 120 MMHG | OXYGEN SATURATION: 98 % | WEIGHT: 186 LBS | HEIGHT: 69 IN | BODY MASS INDEX: 27.55 KG/M2 | TEMPERATURE: 98 F | RESPIRATION RATE: 16 BRPM | DIASTOLIC BLOOD PRESSURE: 76 MMHG | HEART RATE: 54 BPM

## 2020-07-14 DIAGNOSIS — E78.00 PURE HYPERCHOLESTEROLEMIA: ICD-10-CM

## 2020-07-14 DIAGNOSIS — Z01.84 ENCOUNTER FOR ANTIBODY RESPONSE EXAMINATION: ICD-10-CM

## 2020-07-14 DIAGNOSIS — Z01.818 PRE-OP EVALUATION: Primary | ICD-10-CM

## 2020-07-14 DIAGNOSIS — Z20.822 CLOSE EXPOSURE TO COVID-19 VIRUS: ICD-10-CM

## 2020-07-14 PROCEDURE — 99213 OFFICE O/P EST LOW 20 MIN: CPT | Mod: S$GLB,,, | Performed by: INTERNAL MEDICINE

## 2020-07-14 PROCEDURE — 99213 PR OFFICE/OUTPT VISIT, EST, LEVL III, 20-29 MIN: ICD-10-PCS | Mod: S$GLB,,, | Performed by: INTERNAL MEDICINE

## 2020-07-14 PROCEDURE — 3008F BODY MASS INDEX DOCD: CPT | Mod: S$GLB,,, | Performed by: INTERNAL MEDICINE

## 2020-07-14 PROCEDURE — 93000 POCT EKG 12-LEAD: ICD-10-PCS | Mod: S$GLB,,, | Performed by: INTERNAL MEDICINE

## 2020-07-14 PROCEDURE — 93000 ELECTROCARDIOGRAM COMPLETE: CPT | Mod: S$GLB,,, | Performed by: INTERNAL MEDICINE

## 2020-07-14 PROCEDURE — 3008F PR BODY MASS INDEX (BMI) DOCUMENTED: ICD-10-PCS | Mod: S$GLB,,, | Performed by: INTERNAL MEDICINE

## 2020-07-14 NOTE — PROGRESS NOTES
SUBJECTIVE:    Patient ID: Emeterio Nava is a 55 y.o. male.    Chief Complaint: Hyperlipidemia and Follow-up    HPI     Patient returns for recheck.  In January his lipids were found to be elevated, with LDL of 135, and he was placed on Lipitor 20 mg.  Orders were made for follow-up lipids in 6 months, but those have not yet been completed    He is going to have corinne-orbital surgery with MAC anesthesia-    See ROS  No visits with results within 6 Month(s) from this visit.   Latest known visit with results is:   Orders Only on 01/07/2020   Component Date Value Ref Range Status    Cholesterol 01/07/2020 211* <200 mg/dL Final    HDL 01/07/2020 61  >40 mg/dL Final    Triglycerides 01/07/2020 56  <150 mg/dL Final    LDL Cholesterol 01/07/2020 135* mg/dL (calc) Final    Hdl/Cholesterol Ratio 01/07/2020 3.5  <5.0 (calc) Final    Non HDL Chol. (LDL+VLDL) 01/07/2020 150* <130 mg/dL (calc) Final    Total Protein 01/07/2020 6.4  6.1 - 8.1 g/dL Final    Albumin 01/07/2020 4.3  3.6 - 5.1 g/dL Final    Globulin, Total 01/07/2020 2.1  1.9 - 3.7 g/dL (calc) Final    Albumin/Globulin Ratio 01/07/2020 2.0  1.0 - 2.5 (calc) Final    Total Bilirubin 01/07/2020 0.9  0.2 - 1.2 mg/dL Final    Bilirubin, Direct 01/07/2020 0.1  < OR = 0.2 mg/dL Final    Bilirubin, Indirect 01/07/2020 0.8  0.2 - 1.2 mg/dL (calc) Final    Alkaline Phosphatase 01/07/2020 56  40 - 115 U/L Final    AST 01/07/2020 17  10 - 35 U/L Final    ALT 01/07/2020 14  9 - 46 U/L Final    WBC 01/07/2020 6.2  3.8 - 10.8 Thousand/uL Final    RBC 01/07/2020 4.96  4.20 - 5.80 Million/uL Final    Hemoglobin 01/07/2020 14.5  13.2 - 17.1 g/dL Final    Hematocrit 01/07/2020 44.0  38.5 - 50.0 % Final    Mean Corpuscular Volume 01/07/2020 88.7  80.0 - 100.0 fL Final    Mean Corpuscular Hemoglobin 01/07/2020 29.2  27.0 - 33.0 pg Final    Mean Corpuscular Hemoglobin Conc 01/07/2020 33.0  32.0 - 36.0 g/dL Final    RDW 01/07/2020 13.0  11.0 - 15.0 % Final     Platelets 01/07/2020 299  140 - 400 Thousand/uL Final    MPV 01/07/2020 10.3  7.5 - 12.5 fL Final    Neutrophils Absolute 01/07/2020 3,974  1,500 - 7,800 cells/uL Final    Lymph # 01/07/2020 1,314  850 - 3,900 cells/uL Final    Mono # 01/07/2020 725  200 - 950 cells/uL Final    Eos # 01/07/2020 136  15 - 500 cells/uL Final    Baso # 01/07/2020 50  0 - 200 cells/uL Final    Neutrophils Relative 01/07/2020 64.1  % Final    Lymph% 01/07/2020 21.2  % Final    Mono% 01/07/2020 11.7  % Final    Eosinophil% 01/07/2020 2.2  % Final    Basophil% 01/07/2020 0.8  % Final       Past Medical History:   Diagnosis Date    Lymphadenopathy 7/10/2019    Thyroid disease     cyst     Past Surgical History:   Procedure Laterality Date    TONSILLECTOMY       Family History   Problem Relation Age of Onset    Heart disease Mother     Atrial fibrillation Mother     No Known Problems Sister     No Known Problems Brother        Marital Status:   Alcohol History:  reports current alcohol use.  Tobacco History:  reports that he has never smoked. He has never used smokeless tobacco.  Drug History:  reports no history of drug use.    Review of patient's allergies indicates:  No Known Allergies    Current Outpatient Medications:     cetirizine (ZYRTEC) 10 MG tablet, Take 10 mg by mouth daily as needed. , Disp: , Rfl:     multivit with minerals/lutein (MULTIVITAMIN 50 PLUS ORAL), Take 1 tablet by mouth once daily., Disp: , Rfl:     Review of Systems   Constitutional: Negative for appetite change, chills, diaphoresis, fatigue, fever and unexpected weight change.   HENT: Negative for congestion, ear pain, hearing loss, nosebleeds, postnasal drip, sinus pressure, sinus pain, sneezing, sore throat, tinnitus and trouble swallowing.    Eyes: Negative for photophobia, pain and visual disturbance.   Respiratory: Negative for apnea, cough, choking, chest tightness, shortness of breath and wheezing.    Cardiovascular: Negative for  "chest pain, palpitations and leg swelling.   Gastrointestinal: Negative for abdominal distention, abdominal pain, blood in stool, constipation, diarrhea, nausea and vomiting.   Endocrine: Negative for cold intolerance, heat intolerance, polydipsia and polyuria.   Genitourinary: Negative for difficulty urinating, dysuria, flank pain, frequency, hematuria and urgency.   Musculoskeletal: Negative for arthralgias, back pain, joint swelling, myalgias, neck pain and neck stiffness.   Skin: Negative for pallor and rash.   Allergic/Immunologic: Negative for environmental allergies and food allergies.   Neurological: Negative for dizziness, tremors, seizures, syncope, speech difficulty, weakness, light-headedness, numbness and headaches.   Hematological: Does not bruise/bleed easily.   Psychiatric/Behavioral: Negative for agitation, confusion, decreased concentration, sleep disturbance and suicidal ideas. The patient is not nervous/anxious.           Objective:      Vitals:    07/14/20 0908   BP: 120/76   Pulse: (!) 54   Resp: 16   Temp: 97.5 °F (36.4 °C)   SpO2: 98%   Weight: 84.4 kg (186 lb)   Height: 5' 9" (1.753 m)     Physical Exam  Vitals signs and nursing note reviewed.   Constitutional:       General: He is not in acute distress.     Appearance: Normal appearance. He is well-developed. He is not ill-appearing, toxic-appearing or diaphoretic.   HENT:      Head: Normocephalic and atraumatic.      Right Ear: Tympanic membrane, ear canal and external ear normal.      Left Ear: Tympanic membrane, ear canal and external ear normal.      Nose: Nose normal.      Mouth/Throat:      Pharynx: Uvula midline.   Eyes:      General: No scleral icterus.        Right eye: No discharge.         Left eye: No discharge.      Conjunctiva/sclera: Conjunctivae normal.      Pupils: Pupils are equal, round, and reactive to light.      Right eye: Pupil is round and reactive.      Left eye: Pupil is round and reactive.   Neck:      " Musculoskeletal: Normal range of motion and neck supple. No neck rigidity or muscular tenderness.      Thyroid: No thyromegaly.      Vascular: No carotid bruit or JVD.   Cardiovascular:      Rate and Rhythm: Normal rate and regular rhythm.      Heart sounds: Normal heart sounds. No murmur. No gallop.    Pulmonary:      Effort: Pulmonary effort is normal. No respiratory distress.      Breath sounds: Normal breath sounds. No stridor. No wheezing, rhonchi or rales.   Abdominal:      General: Bowel sounds are normal. There is no distension or abdominal bruit.      Palpations: Abdomen is soft. Abdomen is not rigid. There is no mass.      Tenderness: There is no abdominal tenderness. There is no right CVA tenderness, left CVA tenderness, guarding or rebound.      Hernia: No hernia is present.   Musculoskeletal: Normal range of motion.   Lymphadenopathy:      Cervical: No cervical adenopathy.   Skin:     General: Skin is warm and dry.      Capillary Refill: Capillary refill takes less than 2 seconds.      Coloration: Skin is not jaundiced or pale.      Findings: No bruising, erythema, lesion or rash.      Nails: There is no clubbing.     Neurological:      General: No focal deficit present.      Mental Status: He is alert and oriented to person, place, and time. Mental status is at baseline.      Cranial Nerves: No cranial nerve deficit.      Sensory: No sensory deficit.      Motor: No weakness.      Coordination: Coordination normal.      Gait: Gait normal.      Deep Tendon Reflexes: Reflexes normal.   Psychiatric:         Speech: Speech normal.         Behavior: Behavior normal. Behavior is cooperative.         Thought Content: Thought content normal.         Judgment: Judgment normal.           Assessment:       1. Pure hypercholesterolemia         Plan:       Pure hypercholesterolemia      No follow-ups on file.        7/14/2020 Katharina Marcum M.D.

## 2020-07-14 NOTE — LETTER
Counts include 234 beds at the [facility]  [facility]      Katharina Marcum MD              [facility]  [address] [place] [id number]  Dept: [phone]  Dept Fax: [phone]                                                                                                     Re: Name: Emeterio Nava          : 1965        Emeterio Nava has been examined by me on 2020, and appears to be physically well for       __x_ General Anesthesia , MAC anesthesia      ___ Sports Participation     ___ School /      ___ Camp        Sincerely,            Katharina Marcum MD

## 2020-08-02 LAB — EKG 12-LEAD: NORMAL

## 2022-03-29 ENCOUNTER — TELEPHONE (OUTPATIENT)
Dept: FAMILY MEDICINE | Facility: CLINIC | Age: 57
End: 2022-03-29
Payer: COMMERCIAL

## 2022-03-29 DIAGNOSIS — Z11.59 SCREENING EXAMINATION FOR POLIOMYELITIS: ICD-10-CM

## 2022-03-29 DIAGNOSIS — Z12.5 ENCOUNTER FOR PROSTATE CANCER SCREENING: ICD-10-CM

## 2022-03-29 DIAGNOSIS — Z00.00 WELLNESS EXAMINATION: Primary | ICD-10-CM

## 2022-03-29 DIAGNOSIS — Z11.59 NEED FOR HEPATITIS C SCREENING TEST: ICD-10-CM

## 2022-03-29 DIAGNOSIS — Z11.4 ENCOUNTER FOR SCREENING FOR HIV: ICD-10-CM

## 2022-03-29 DIAGNOSIS — E78.00 PURE HYPERCHOLESTEROLEMIA: ICD-10-CM

## 2022-03-29 NOTE — TELEPHONE ENCOUNTER
Diagnosis has to go with all labs ordered-check the chart-they have all been assigned-bring the dxs forward

## 2022-04-03 NOTE — PROGRESS NOTES
Please call the patient if they do not have portal access.  Please let Mr Nava know his labs look normal he should make follow up with Dr Marcum.

## 2022-04-04 ENCOUNTER — PATIENT MESSAGE (OUTPATIENT)
Dept: FAMILY MEDICINE | Facility: CLINIC | Age: 57
End: 2022-04-04

## 2022-04-04 ENCOUNTER — OFFICE VISIT (OUTPATIENT)
Dept: FAMILY MEDICINE | Facility: CLINIC | Age: 57
End: 2022-04-04
Payer: COMMERCIAL

## 2022-04-04 VITALS
RESPIRATION RATE: 14 BRPM | HEIGHT: 69 IN | SYSTOLIC BLOOD PRESSURE: 132 MMHG | WEIGHT: 192 LBS | OXYGEN SATURATION: 97 % | DIASTOLIC BLOOD PRESSURE: 76 MMHG | BODY MASS INDEX: 28.44 KG/M2 | HEART RATE: 68 BPM

## 2022-04-04 DIAGNOSIS — Z11.4 ENCOUNTER FOR SCREENING FOR HIV: ICD-10-CM

## 2022-04-04 DIAGNOSIS — Z13.6 ENCOUNTER FOR SCREENING FOR CARDIOVASCULAR DISORDERS: ICD-10-CM

## 2022-04-04 DIAGNOSIS — Z12.11 SPECIAL SCREENING FOR MALIGNANT NEOPLASMS, COLON: Primary | ICD-10-CM

## 2022-04-04 DIAGNOSIS — E78.00 PURE HYPERCHOLESTEROLEMIA: ICD-10-CM

## 2022-04-04 DIAGNOSIS — R53.83 FATIGUE, UNSPECIFIED TYPE: ICD-10-CM

## 2022-04-04 PROCEDURE — 3078F PR MOST RECENT DIASTOLIC BLOOD PRESSURE < 80 MM HG: ICD-10-PCS | Mod: S$GLB,,, | Performed by: INTERNAL MEDICINE

## 2022-04-04 PROCEDURE — 3075F PR MOST RECENT SYSTOLIC BLOOD PRESS GE 130-139MM HG: ICD-10-PCS | Mod: S$GLB,,, | Performed by: INTERNAL MEDICINE

## 2022-04-04 PROCEDURE — 99396 PR PREVENTIVE VISIT,EST,40-64: ICD-10-PCS | Mod: S$GLB,,, | Performed by: INTERNAL MEDICINE

## 2022-04-04 PROCEDURE — 3078F DIAST BP <80 MM HG: CPT | Mod: S$GLB,,, | Performed by: INTERNAL MEDICINE

## 2022-04-04 PROCEDURE — 3075F SYST BP GE 130 - 139MM HG: CPT | Mod: S$GLB,,, | Performed by: INTERNAL MEDICINE

## 2022-04-04 PROCEDURE — 3008F BODY MASS INDEX DOCD: CPT | Mod: S$GLB,,, | Performed by: INTERNAL MEDICINE

## 2022-04-04 PROCEDURE — 3008F PR BODY MASS INDEX (BMI) DOCUMENTED: ICD-10-PCS | Mod: S$GLB,,, | Performed by: INTERNAL MEDICINE

## 2022-04-04 PROCEDURE — 99396 PREV VISIT EST AGE 40-64: CPT | Mod: S$GLB,,, | Performed by: INTERNAL MEDICINE

## 2022-04-04 PROCEDURE — 1160F RVW MEDS BY RX/DR IN RCRD: CPT | Mod: S$GLB,,, | Performed by: INTERNAL MEDICINE

## 2022-04-04 PROCEDURE — 1160F PR REVIEW ALL MEDS BY PRESCRIBER/CLIN PHARMACIST DOCUMENTED: ICD-10-PCS | Mod: S$GLB,,, | Performed by: INTERNAL MEDICINE

## 2022-04-04 NOTE — PROGRESS NOTES
SUBJECTIVE:    Patient ID: Emeterio Nava is a 57 y.o. male.    Chief Complaint: Results and Follow-up    HPI ANNUAL       Date Due Completion Date    COVID-19 Vaccine (1) Never done ---    HIV Screening Never done ---    Shingles Vaccine (1 of 2) Never done ---    Influenza Vaccine (1) Never done ---    Colorectal Cancer Screening 01/21/2022 1/21/2019    Lipid Panel 04/02/2027 4/2/2022    TETANUS VACCINE 01/14/2030 1/14/2020          Labs- WBC 5.2 H/H 15.2/46.9 Platelets 348,000 BUN 15 Cr 1.1 Chol 227     Telephone on 03/29/2022   Component Date Value Ref Range Status    WBC 04/02/2022 5.2  3.8 - 10.8 Thousand/uL Final    RBC 04/02/2022 5.26  4.20 - 5.80 Million/uL Final    Hemoglobin 04/02/2022 15.2  13.2 - 17.1 g/dL Final    Hematocrit 04/02/2022 46.9  38.5 - 50.0 % Final    MCV 04/02/2022 89.2  80.0 - 100.0 fL Final    MCH 04/02/2022 28.9  27.0 - 33.0 pg Final    MCHC 04/02/2022 32.4  32.0 - 36.0 g/dL Final    RDW 04/02/2022 12.9  11.0 - 15.0 % Final    Platelets 04/02/2022 348  140 - 400 Thousand/uL Final    MPV 04/02/2022 10.1  7.5 - 12.5 fL Final    Neutrophils, Abs 04/02/2022 2,959  1,500 - 7,800 cells/uL Final    Lymph # 04/02/2022 1,264  850 - 3,900 cells/uL Final    Mono # 04/02/2022 712  200 - 950 cells/uL Final    Eos # 04/02/2022 203  15 - 500 cells/uL Final    Baso # 04/02/2022 62  0 - 200 cells/uL Final    Neutrophils Relative 04/02/2022 56.9  % Final    Lymph % 04/02/2022 24.3  % Final    Mono % 04/02/2022 13.7  % Final    Eosinophil % 04/02/2022 3.9  % Final    Basophil % 04/02/2022 1.2  % Final    Glucose 04/02/2022 83  65 - 99 mg/dL Final    BUN 04/02/2022 21  7 - 25 mg/dL Final    Creatinine 04/02/2022 1.15  0.70 - 1.33 mg/dL Final    eGFR if non African American 04/02/2022 70  > OR = 60 mL/min/1.73m2 Final    eGFR if  04/02/2022 81  > OR = 60 mL/min/1.73m2 Final    BUN/Creatinine Ratio 04/02/2022 NOT APPLICABLE  6 - 22 (calc) Final    Sodium  04/02/2022 135  135 - 146 mmol/L Final    Potassium 04/02/2022 4.7  3.5 - 5.3 mmol/L Final    Chloride 04/02/2022 100  98 - 110 mmol/L Final    CO2 04/02/2022 30  20 - 32 mmol/L Final    Calcium 04/02/2022 9.2  8.6 - 10.3 mg/dL Final    Total Protein 04/02/2022 6.6  6.1 - 8.1 g/dL Final    Albumin 04/02/2022 4.3  3.6 - 5.1 g/dL Final    Globulin, Total 04/02/2022 2.3  1.9 - 3.7 g/dL (calc) Final    Albumin/Globulin Ratio 04/02/2022 1.9  1.0 - 2.5 (calc) Final    Total Bilirubin 04/02/2022 0.7  0.2 - 1.2 mg/dL Final    Alkaline Phosphatase 04/02/2022 55  35 - 144 U/L Final    AST 04/02/2022 24  10 - 35 U/L Final    ALT 04/02/2022 23  9 - 46 U/L Final    Cholesterol 04/02/2022 227 (A) <200 mg/dL Final    HDL 04/02/2022 62  > OR = 40 mg/dL Final    Triglycerides 04/02/2022 76  <150 mg/dL Final    LDL Cholesterol 04/02/2022 147 (A) mg/dL (calc) Final    HDL/Cholesterol Ratio 04/02/2022 3.7  <5.0 (calc) Final    Non HDL Chol. (LDL+VLDL) 04/02/2022 165 (A) <130 mg/dL (calc) Final    PROSTATE SPECIFIC ANTIGEN, SCR - Q* 04/02/2022 0.29  < OR = 4.00 ng/mL Final    Total Protein 04/02/2022 6.6  6.1 - 8.1 g/dL Final    Albumin 04/02/2022 4.3  3.6 - 5.1 g/dL Final    Globulin, Total 04/02/2022 2.3  1.9 - 3.7 g/dL (calc) Final    Albumin/Globulin Ratio 04/02/2022 1.9  1.0 - 2.5 (calc) Final    Total Bilirubin 04/02/2022 0.7  0.2 - 1.2 mg/dL Final    Bilirubin, Direct 04/02/2022 0.1  < OR = 0.2 mg/dL Final    Bilirubin, Indirect 04/02/2022 0.6  0.2 - 1.2 mg/dL (calc) Final    Alkaline Phosphatase 04/02/2022 55  35 - 144 U/L Final    AST 04/02/2022 24  10 - 35 U/L Final    ALT 04/02/2022 23  9 - 46 U/L Final    Hepatitis C Ab 04/02/2022 NON-REACTIVE  NON-REACTIVE Final    Signal/Cutoff 04/02/2022 0.00  <1.00 Final    Color, UA 04/02/2022 YELLOW  YELLOW Final    Appearance, UA 04/02/2022 CLEAR  CLEAR Final    Specific Gravity, UA 04/02/2022 1.006  1.001 - 1.035 Final    pH, UA 04/02/2022 6.0  5.0  - 8.0 Final    Glucose, UA 04/02/2022 NEGATIVE  NEGATIVE Final    Bilirubin, UA 04/02/2022 NEGATIVE  NEGATIVE Final    Ketones, UA 04/02/2022 NEGATIVE  NEGATIVE Final    Occult Blood UA 04/02/2022 NEGATIVE  NEGATIVE Final    Protein, UA 04/02/2022 NEGATIVE  NEGATIVE Final    Nitrite, UA 04/02/2022 NEGATIVE  NEGATIVE Final    Leukocytes, UA 04/02/2022 NEGATIVE  NEGATIVE Final    WBC Casts, UA 04/02/2022 NONE SEEN  < OR = 5 /HPF Final    RBC Casts, UA 04/02/2022 NONE SEEN  < OR = 2 /HPF Final    Squam Epithel, UA 04/02/2022 NONE SEEN  < OR = 5 /HPF Final    Bacteria, UA 04/02/2022 NONE SEEN  NONE SEEN /HPF Final    Hyaline Casts, UA 04/02/2022 NONE SEEN  NONE SEEN /LPF Final    Reflexive Urine Culture 04/02/2022    Final       Past Medical History:   Diagnosis Date    Lymphadenopathy 7/10/2019    Thyroid disease     cyst     Past Surgical History:   Procedure Laterality Date    TONSILLECTOMY       Family History   Problem Relation Age of Onset    Heart disease Mother     Atrial fibrillation Mother     No Known Problems Sister     No Known Problems Brother        Marital Status:   Alcohol History:  reports current alcohol use.  Tobacco History:  reports that he has never smoked. He has never used smokeless tobacco.  Drug History:  reports no history of drug use.    Review of patient's allergies indicates:   Allergen Reactions    Hay fever and allergy relief        Current Outpatient Medications:     cetirizine (ZYRTEC) 10 MG tablet, Take 10 mg by mouth daily as needed. , Disp: , Rfl:     Review of Systems   Constitutional: Negative for appetite change, chills, diaphoresis, fatigue, fever and unexpected weight change.   HENT: Negative for congestion, ear pain, hearing loss, nosebleeds, postnasal drip, sinus pressure, sinus pain, sneezing, sore throat and trouble swallowing.    Eyes: Negative for photophobia, pain and visual disturbance.   Respiratory: Negative for apnea, cough, choking,  "chest tightness, shortness of breath and wheezing.    Cardiovascular: Negative for chest pain, palpitations and leg swelling.   Gastrointestinal: Negative for abdominal distention, abdominal pain, blood in stool, constipation, diarrhea, nausea and vomiting.   Endocrine: Negative for cold intolerance, heat intolerance, polydipsia and polyuria.   Genitourinary: Negative for difficulty urinating, dysuria, flank pain, frequency, hematuria and urgency.        Minimal ED   Musculoskeletal: Negative for arthralgias, back pain, joint swelling, myalgias, neck pain and neck stiffness.   Skin: Negative for pallor and rash.   Allergic/Immunologic: Negative for environmental allergies and food allergies.   Neurological: Negative for dizziness, tremors, seizures, syncope, speech difficulty, weakness, light-headedness, numbness and headaches.   Hematological: Does not bruise/bleed easily.   Psychiatric/Behavioral: Negative for agitation, confusion, decreased concentration, sleep disturbance and suicidal ideas. The patient is not nervous/anxious.           Objective:      Vitals:    04/04/22 0956   BP: 132/76   Pulse: 68   Resp: 14   SpO2: 97%   Weight: 87.1 kg (192 lb)   Height: 5' 9" (1.753 m)     Physical Exam  Constitutional:       Appearance: Normal appearance. He is normal weight.   HENT:      Head: Normocephalic and atraumatic.   Eyes:      Extraocular Movements: Extraocular movements intact.      Conjunctiva/sclera: Conjunctivae normal.      Pupils: Pupils are equal, round, and reactive to light.   Neck:      Vascular: No carotid bruit.   Cardiovascular:      Rate and Rhythm: Normal rate and regular rhythm.      Pulses: Normal pulses.      Heart sounds: Normal heart sounds. No murmur heard.    No friction rub. No gallop.   Pulmonary:      Effort: Pulmonary effort is normal.      Breath sounds: Normal breath sounds.   Abdominal:      General: Bowel sounds are normal.      Palpations: Abdomen is soft.   Musculoskeletal:      " Cervical back: Normal range of motion and neck supple.      Right lower leg: No edema.      Left lower leg: No edema.   Lymphadenopathy:      Cervical: No cervical adenopathy.   Skin:     General: Skin is warm and dry.   Neurological:      General: No focal deficit present.      Mental Status: He is alert and oriented to person, place, and time.   Psychiatric:         Mood and Affect: Mood normal.         Behavior: Behavior normal.         Thought Content: Thought content normal.         Judgment: Judgment normal.           Assessment:       1. Special screening for malignant neoplasms, colon    2. Encounter for screening for HIV    3. Pure hypercholesterolemia    4. Fatigue, unspecified type         Plan:       Special screening for malignant neoplasms, colon    Encounter for screening for HIV    Pure hypercholesterolemia    Fatigue, unspecified type      No follow-ups on file.        4/4/2022 Katharina Marcum M.D.

## 2022-04-05 LAB
ALBUMIN SERPL-MCNC: 4.3 G/DL (ref 3.6–5.1)
ALBUMIN SERPL-MCNC: 4.3 G/DL (ref 3.6–5.1)
ALBUMIN/GLOB SERPL: 1.9 (CALC) (ref 1–2.5)
ALBUMIN/GLOB SERPL: 1.9 (CALC) (ref 1–2.5)
ALP SERPL-CCNC: 55 U/L (ref 35–144)
ALP SERPL-CCNC: 55 U/L (ref 35–144)
ALT SERPL-CCNC: 23 U/L (ref 9–46)
ALT SERPL-CCNC: 23 U/L (ref 9–46)
APPEARANCE UR: CLEAR
AST SERPL-CCNC: 24 U/L (ref 10–35)
AST SERPL-CCNC: 24 U/L (ref 10–35)
BACTERIA #/AREA URNS HPF: NORMAL /HPF
BACTERIA UR CULT: NORMAL
BASOPHILS # BLD AUTO: 62 CELLS/UL (ref 0–200)
BASOPHILS NFR BLD AUTO: 1.2 %
BILIRUB DIRECT SERPL-MCNC: 0.1 MG/DL
BILIRUB INDIRECT SERPL-MCNC: 0.6 MG/DL (CALC) (ref 0.2–1.2)
BILIRUB SERPL-MCNC: 0.7 MG/DL (ref 0.2–1.2)
BILIRUB SERPL-MCNC: 0.7 MG/DL (ref 0.2–1.2)
BILIRUB UR QL STRIP: NEGATIVE
BUN SERPL-MCNC: 21 MG/DL (ref 7–25)
BUN/CREAT SERPL: NORMAL (CALC) (ref 6–22)
CALCIUM SERPL-MCNC: 9.2 MG/DL (ref 8.6–10.3)
CHLORIDE SERPL-SCNC: 100 MMOL/L (ref 98–110)
CHOLEST SERPL-MCNC: 227 MG/DL
CHOLEST/HDLC SERPL: 3.7 (CALC)
CLIENT CONTACT:: NORMAL
CO2 SERPL-SCNC: 30 MMOL/L (ref 20–32)
COLOR UR: YELLOW
COMMENT: NORMAL
CREAT SERPL-MCNC: 1.15 MG/DL (ref 0.7–1.33)
EOSINOPHIL # BLD AUTO: 203 CELLS/UL (ref 15–500)
EOSINOPHIL NFR BLD AUTO: 3.9 %
ERYTHROCYTE [DISTWIDTH] IN BLOOD BY AUTOMATED COUNT: 12.9 % (ref 11–15)
GLOBULIN SER CALC-MCNC: 2.3 G/DL (CALC) (ref 1.9–3.7)
GLOBULIN SER CALC-MCNC: 2.3 G/DL (CALC) (ref 1.9–3.7)
GLUCOSE SERPL-MCNC: 83 MG/DL (ref 65–99)
GLUCOSE UR QL STRIP: NEGATIVE
HCT VFR BLD AUTO: 46.9 % (ref 38.5–50)
HCV AB S/CO SERPL IA: 0
HCV AB SERPL QL IA: NORMAL
HDLC SERPL-MCNC: 62 MG/DL
HGB BLD-MCNC: 15.2 G/DL (ref 13.2–17.1)
HGB UR QL STRIP: NEGATIVE
HIV 1+2 AB+HIV1 P24 AG SERPL QL IA: NORMAL
HYALINE CASTS #/AREA URNS LPF: NORMAL /LPF
KETONES UR QL STRIP: NEGATIVE
LDLC SERPL CALC-MCNC: 147 MG/DL (CALC)
LEUKOCYTE ESTERASE UR QL STRIP: NEGATIVE
LYMPHOCYTES # BLD AUTO: 1264 CELLS/UL (ref 850–3900)
LYMPHOCYTES NFR BLD AUTO: 24.3 %
Lab: NORMAL
MCH RBC QN AUTO: 28.9 PG (ref 27–33)
MCHC RBC AUTO-ENTMCNC: 32.4 G/DL (ref 32–36)
MCV RBC AUTO: 89.2 FL (ref 80–100)
MONOCYTES # BLD AUTO: 712 CELLS/UL (ref 200–950)
MONOCYTES NFR BLD AUTO: 13.7 %
NEUTROPHILS # BLD AUTO: 2959 CELLS/UL (ref 1500–7800)
NEUTROPHILS NFR BLD AUTO: 56.9 %
NITRITE UR QL STRIP: NEGATIVE
NONHDLC SERPL-MCNC: 165 MG/DL (CALC)
PH UR STRIP: 6 [PH] (ref 5–8)
PLATELET # BLD AUTO: 348 THOUSAND/UL (ref 140–400)
PMV BLD REES-ECKER: 10.1 FL (ref 7.5–12.5)
POTASSIUM SERPL-SCNC: 4.7 MMOL/L (ref 3.5–5.3)
PROT SERPL-MCNC: 6.6 G/DL (ref 6.1–8.1)
PROT SERPL-MCNC: 6.6 G/DL (ref 6.1–8.1)
PROT UR QL STRIP: NEGATIVE
PSA SERPL-MCNC: 0.29 NG/ML
RBC # BLD AUTO: 5.26 MILLION/UL (ref 4.2–5.8)
RBC #/AREA URNS HPF: NORMAL /HPF
REF LAB TEST NAME: NORMAL
REF LAB TEST: NORMAL
SODIUM SERPL-SCNC: 135 MMOL/L (ref 135–146)
SP GR UR STRIP: 1.01 (ref 1–1.03)
SQUAMOUS #/AREA URNS HPF: NORMAL /HPF
TESTOST SERPL-MCNC: 800 NG/DL (ref 250–827)
TRIGL SERPL-MCNC: 76 MG/DL
WBC # BLD AUTO: 5.2 THOUSAND/UL (ref 3.8–10.8)
WBC #/AREA URNS HPF: NORMAL /HPF

## 2022-04-08 ENCOUNTER — HOSPITAL ENCOUNTER (OUTPATIENT)
Dept: RADIOLOGY | Facility: HOSPITAL | Age: 57
Discharge: HOME | End: 2022-04-08
Attending: INTERNAL MEDICINE

## 2022-04-08 ENCOUNTER — LAB VISIT (OUTPATIENT)
Dept: LAB | Facility: HOSPITAL | Age: 57
End: 2022-04-08
Attending: INTERNAL MEDICINE
Payer: COMMERCIAL

## 2022-04-08 DIAGNOSIS — Z11.4 ENCOUNTER FOR SCREENING FOR HIV: ICD-10-CM

## 2022-04-08 DIAGNOSIS — Z13.6 ENCOUNTER FOR SCREENING FOR CARDIOVASCULAR DISORDERS: ICD-10-CM

## 2022-04-08 DIAGNOSIS — R53.83 FATIGUE, UNSPECIFIED TYPE: ICD-10-CM

## 2022-04-08 DIAGNOSIS — E78.00 PURE HYPERCHOLESTEROLEMIA: ICD-10-CM

## 2022-04-08 PROCEDURE — 75571 CT HRT W/O DYE W/CA TEST: CPT | Mod: TC

## 2022-04-08 PROCEDURE — 36415 COLL VENOUS BLD VENIPUNCTURE: CPT | Performed by: INTERNAL MEDICINE

## 2022-04-08 PROCEDURE — 87389 HIV-1 AG W/HIV-1&-2 AB AG IA: CPT | Performed by: INTERNAL MEDICINE

## 2022-04-08 PROCEDURE — 84403 ASSAY OF TOTAL TESTOSTERONE: CPT | Performed by: INTERNAL MEDICINE

## 2022-04-09 LAB
HIV 1+2 AB+HIV1 P24 AG SERPL QL IA: NON REACTIVE
TESTOST SERPL-MCNC: 598 NG/DL (ref 264–916)

## 2022-04-11 ENCOUNTER — TELEPHONE (OUTPATIENT)
Dept: FAMILY MEDICINE | Facility: CLINIC | Age: 57
End: 2022-04-11

## 2022-04-11 NOTE — TELEPHONE ENCOUNTER
----- Message from Katharina Marcum MD sent at 4/10/2022  9:38 AM CDT -----  Test results are normal.

## 2023-04-04 ENCOUNTER — OFFICE VISIT (OUTPATIENT)
Dept: FAMILY MEDICINE | Facility: CLINIC | Age: 58
End: 2023-04-04
Payer: COMMERCIAL

## 2023-04-04 VITALS
HEART RATE: 66 BPM | BODY MASS INDEX: 29.03 KG/M2 | OXYGEN SATURATION: 96 % | SYSTOLIC BLOOD PRESSURE: 128 MMHG | RESPIRATION RATE: 14 BRPM | DIASTOLIC BLOOD PRESSURE: 76 MMHG | WEIGHT: 196 LBS | TEMPERATURE: 98 F | HEIGHT: 69 IN

## 2023-04-04 DIAGNOSIS — E78.00 PURE HYPERCHOLESTEROLEMIA: Primary | ICD-10-CM

## 2023-04-04 DIAGNOSIS — Z00.00 ROUTINE GENERAL MEDICAL EXAMINATION AT A HEALTH CARE FACILITY: Primary | ICD-10-CM

## 2023-04-04 DIAGNOSIS — Z13.1 SCREENING FOR DIABETES MELLITUS: ICD-10-CM

## 2023-04-04 DIAGNOSIS — E78.00 PURE HYPERCHOLESTEROLEMIA: ICD-10-CM

## 2023-04-04 DIAGNOSIS — Z00.00 WELLNESS EXAMINATION: ICD-10-CM

## 2023-04-04 DIAGNOSIS — Z12.5 ENCOUNTER FOR PROSTATE CANCER SCREENING: ICD-10-CM

## 2023-04-04 DIAGNOSIS — Z12.5 PROSTATE CANCER SCREENING: ICD-10-CM

## 2023-04-04 PROCEDURE — 1160F PR REVIEW ALL MEDS BY PRESCRIBER/CLIN PHARMACIST DOCUMENTED: ICD-10-PCS | Mod: CPTII,S$GLB,, | Performed by: INTERNAL MEDICINE

## 2023-04-04 PROCEDURE — 3074F PR MOST RECENT SYSTOLIC BLOOD PRESSURE < 130 MM HG: ICD-10-PCS | Mod: CPTII,S$GLB,, | Performed by: INTERNAL MEDICINE

## 2023-04-04 PROCEDURE — 3008F PR BODY MASS INDEX (BMI) DOCUMENTED: ICD-10-PCS | Mod: CPTII,S$GLB,, | Performed by: INTERNAL MEDICINE

## 2023-04-04 PROCEDURE — 99396 PREV VISIT EST AGE 40-64: CPT | Mod: S$GLB,,, | Performed by: INTERNAL MEDICINE

## 2023-04-04 PROCEDURE — 1159F PR MEDICATION LIST DOCUMENTED IN MEDICAL RECORD: ICD-10-PCS | Mod: CPTII,S$GLB,, | Performed by: INTERNAL MEDICINE

## 2023-04-04 PROCEDURE — 3078F PR MOST RECENT DIASTOLIC BLOOD PRESSURE < 80 MM HG: ICD-10-PCS | Mod: CPTII,S$GLB,, | Performed by: INTERNAL MEDICINE

## 2023-04-04 PROCEDURE — 3078F DIAST BP <80 MM HG: CPT | Mod: CPTII,S$GLB,, | Performed by: INTERNAL MEDICINE

## 2023-04-04 PROCEDURE — 99396 PR PREVENTIVE VISIT,EST,40-64: ICD-10-PCS | Mod: S$GLB,,, | Performed by: INTERNAL MEDICINE

## 2023-04-04 PROCEDURE — 1159F MED LIST DOCD IN RCRD: CPT | Mod: CPTII,S$GLB,, | Performed by: INTERNAL MEDICINE

## 2023-04-04 PROCEDURE — 3008F BODY MASS INDEX DOCD: CPT | Mod: CPTII,S$GLB,, | Performed by: INTERNAL MEDICINE

## 2023-04-04 PROCEDURE — 1160F RVW MEDS BY RX/DR IN RCRD: CPT | Mod: CPTII,S$GLB,, | Performed by: INTERNAL MEDICINE

## 2023-04-04 PROCEDURE — 3074F SYST BP LT 130 MM HG: CPT | Mod: CPTII,S$GLB,, | Performed by: INTERNAL MEDICINE

## 2023-04-04 RX ORDER — DIAPER,BRIEF,ADULT, DISPOSABLE
EACH MISCELLANEOUS
COMMUNITY

## 2023-04-04 NOTE — PROGRESS NOTES
SUBJECTIVE:    Patient ID: Emeterio Nava is a 58 y.o. male.    Chief Complaint: Annual Exam    HPI   Annual visit    Health Maintenance         Date Due Completion Date    Hemoglobin A1c (Diabetic Prevention Screening) Never done ---    COVID-19 Vaccine (3 - Booster) 11/10/2021 9/15/2021    Influenza Vaccine (1) Never done ---    Shingles Vaccine (1 of 2) 04/04/2023 (Originally 3/29/2015) ---    Lipid Panel 04/02/2027 4/2/2022    TETANUS VACCINE 01/14/2030 1/14/2020    Colorectal Cancer Screening 04/28/2032 4/28/2022            Lab Visit on 04/08/2022   Component Date Value Ref Range Status    HIV Screen 4th Generation wRfx 04/08/2022 Non Reactive  Non Reactive Final    Testosterone 04/08/2022 598  264 - 916 ng/dL Final       Past Medical History:   Diagnosis Date    Lymphadenopathy 7/10/2019    Thyroid disease     cyst     Past Surgical History:   Procedure Laterality Date    TONSILLECTOMY       Family History   Problem Relation Age of Onset    Heart disease Mother     Atrial fibrillation Mother     No Known Problems Sister     No Known Problems Brother        Marital Status:   Alcohol History:  reports current alcohol use. rarely  Tobacco History:  reports that he has never smoked. He has never used smokeless tobacco.  Drug History:  reports no history of drug use.    Review of patient's allergies indicates:   Allergen Reactions    Hay fever and allergy relief        Current Outpatient Medications:     amino acids 15-12.3 gram/86.2 gram PwPk, Take by mouth., Disp: , Rfl:     amino acids-whey prot conc,iso (WHEY PROTEIN) 20 gram-140 kcal/39 gram Powd, Take by mouth., Disp: , Rfl:     cetirizine (ZYRTEC) 10 MG tablet, Take 10 mg by mouth daily as needed. , Disp: , Rfl:     multivitamin with minerals tablet, Take 1 tablet by mouth once daily., Disp: , Rfl:     Review of Systems   Constitutional:  Positive for appetite change. Negative for activity change, chills, diaphoresis, fatigue, fever  and unexpected weight change.   HENT:  Negative for congestion, ear pain, hearing loss, nosebleeds, postnasal drip, rhinorrhea, sinus pressure, sinus pain, sneezing, sore throat and trouble swallowing.         Just got over congestive issues   Eyes:  Positive for redness. Negative for photophobia, pain, discharge and visual disturbance.   Respiratory:  Negative for apnea, cough, choking, chest tightness, shortness of breath and wheezing.    Cardiovascular:  Negative for chest pain, palpitations and leg swelling.   Gastrointestinal:  Negative for abdominal distention, abdominal pain, blood in stool, constipation, diarrhea, nausea and vomiting.   Endocrine: Negative for cold intolerance, heat intolerance, polydipsia and polyuria.   Genitourinary:  Negative for difficulty urinating, dysuria, flank pain, frequency, hematuria and urgency.   Musculoskeletal:  Negative for arthralgias, back pain, joint swelling, myalgias, neck pain and neck stiffness.   Skin:  Negative for pallor and rash.   Allergic/Immunologic: Negative for environmental allergies and food allergies.   Neurological:  Negative for dizziness, tremors, seizures, syncope, speech difficulty, weakness, light-headedness, numbness and headaches.   Hematological:  Does not bruise/bleed easily.   Psychiatric/Behavioral:  Negative for agitation, confusion, decreased concentration, dysphoric mood, sleep disturbance and suicidal ideas. The patient is not nervous/anxious.         Objective:      Vitals:    04/04/23 0955   BP: 128/76   Pulse: 66   Resp: 14   Temp: 98.4 °F (36.9 °C)       Physical Exam  Vitals and nursing note reviewed.   Constitutional:       General: He is not in acute distress.     Appearance: Normal appearance. He is not ill-appearing, toxic-appearing or diaphoretic.      Comments: Muscular male NAD   HENT:      Head: Normocephalic and atraumatic.      Right Ear: Tympanic membrane normal.      Left Ear: Tympanic membrane normal.      Nose: Nose  normal.      Mouth/Throat:      Mouth: Mucous membranes are moist.      Pharynx: Oropharynx is clear.   Eyes:      Extraocular Movements: Extraocular movements intact.      Pupils: Pupils are equal, round, and reactive to light.   Neck:      Vascular: No carotid bruit.   Cardiovascular:      Rate and Rhythm: Normal rate and regular rhythm.      Pulses: Normal pulses.      Heart sounds: No murmur heard.    No friction rub.   Pulmonary:      Effort: Pulmonary effort is normal.      Breath sounds: Normal breath sounds.   Abdominal:      General: Abdomen is flat. Bowel sounds are normal. There is no distension.      Palpations: Abdomen is soft. There is no mass.      Tenderness: There is no abdominal tenderness.      Hernia: No hernia is present.   Musculoskeletal:      Cervical back: Normal range of motion and neck supple.      Right lower leg: No edema.      Left lower leg: No edema.   Lymphadenopathy:      Cervical: No cervical adenopathy.   Skin:     General: Skin is warm and dry.      Capillary Refill: Capillary refill takes less than 2 seconds.   Neurological:      General: No focal deficit present.      Mental Status: He is alert and oriented to person, place, and time.      Motor: No weakness.      Gait: Gait normal.      Deep Tendon Reflexes: Reflexes normal.   Psychiatric:         Mood and Affect: Mood normal.         Behavior: Behavior normal.         Thought Content: Thought content normal.         Judgment: Judgment normal.         Assessment:        Wellness exam  Plan:     No follow-ups on file.        4/4/2023 Katharina Marcum M.D.

## 2023-04-19 LAB
ALBUMIN SERPL-MCNC: 4 G/DL (ref 3.6–5.1)
ALBUMIN/GLOB SERPL: 1.7 (CALC) (ref 1–2.5)
ALP SERPL-CCNC: 49 U/L (ref 35–144)
ALT SERPL-CCNC: 36 U/L (ref 9–46)
AST SERPL-CCNC: 39 U/L (ref 10–35)
BASOPHILS # BLD AUTO: 59 CELLS/UL (ref 0–200)
BASOPHILS NFR BLD AUTO: 0.9 %
BILIRUB SERPL-MCNC: 0.9 MG/DL (ref 0.2–1.2)
BUN SERPL-MCNC: 18 MG/DL (ref 7–25)
BUN/CREAT SERPL: ABNORMAL (CALC) (ref 6–22)
CALCIUM SERPL-MCNC: 9 MG/DL (ref 8.6–10.3)
CHLORIDE SERPL-SCNC: 103 MMOL/L (ref 98–110)
CHOLEST SERPL-MCNC: 239 MG/DL
CHOLEST/HDLC SERPL: 4.1 (CALC)
CO2 SERPL-SCNC: 28 MMOL/L (ref 20–32)
CREAT SERPL-MCNC: 0.95 MG/DL (ref 0.7–1.3)
EGFR: 93 ML/MIN/1.73M2
EOSINOPHIL # BLD AUTO: 191 CELLS/UL (ref 15–500)
EOSINOPHIL NFR BLD AUTO: 2.9 %
ERYTHROCYTE [DISTWIDTH] IN BLOOD BY AUTOMATED COUNT: 13.1 % (ref 11–15)
GLOBULIN SER CALC-MCNC: 2.4 G/DL (CALC) (ref 1.9–3.7)
GLUCOSE SERPL-MCNC: 86 MG/DL (ref 65–99)
HBA1C MFR BLD: 5.1 % OF TOTAL HGB
HCT VFR BLD AUTO: 43.9 % (ref 38.5–50)
HDLC SERPL-MCNC: 58 MG/DL
HGB BLD-MCNC: 14.3 G/DL (ref 13.2–17.1)
LDLC SERPL CALC-MCNC: 164 MG/DL (CALC)
LYMPHOCYTES # BLD AUTO: 1030 CELLS/UL (ref 850–3900)
LYMPHOCYTES NFR BLD AUTO: 15.6 %
MCH RBC QN AUTO: 29.1 PG (ref 27–33)
MCHC RBC AUTO-ENTMCNC: 32.6 G/DL (ref 32–36)
MCV RBC AUTO: 89.4 FL (ref 80–100)
MONOCYTES # BLD AUTO: 680 CELLS/UL (ref 200–950)
MONOCYTES NFR BLD AUTO: 10.3 %
NEUTROPHILS # BLD AUTO: 4640 CELLS/UL (ref 1500–7800)
NEUTROPHILS NFR BLD AUTO: 70.3 %
NONHDLC SERPL-MCNC: 181 MG/DL (CALC)
PLATELET # BLD AUTO: 352 THOUSAND/UL (ref 140–400)
PMV BLD REES-ECKER: 9.8 FL (ref 7.5–12.5)
POTASSIUM SERPL-SCNC: 4.5 MMOL/L (ref 3.5–5.3)
PROT SERPL-MCNC: 6.4 G/DL (ref 6.1–8.1)
PSA SERPL-MCNC: 0.48 NG/ML
RBC # BLD AUTO: 4.91 MILLION/UL (ref 4.2–5.8)
SODIUM SERPL-SCNC: 138 MMOL/L (ref 135–146)
TRIGL SERPL-MCNC: 71 MG/DL
WBC # BLD AUTO: 6.6 THOUSAND/UL (ref 3.8–10.8)

## 2023-04-20 DIAGNOSIS — E78.00 PURE HYPERCHOLESTEROLEMIA: Primary | ICD-10-CM

## 2023-04-20 RX ORDER — ATORVASTATIN CALCIUM 40 MG/1
40 TABLET, FILM COATED ORAL DAILY
Qty: 90 TABLET | Refills: 1 | Status: SHIPPED | OUTPATIENT
Start: 2023-04-20 | End: 2024-04-19

## 2025-04-08 DIAGNOSIS — R07.89 OTHER CHEST PAIN: Primary | ICD-10-CM

## 2025-04-16 ENCOUNTER — HOSPITAL ENCOUNTER (OUTPATIENT)
Dept: RADIOLOGY | Facility: HOSPITAL | Age: 60
Discharge: HOME | End: 2025-04-16
Attending: INTERNAL MEDICINE
Payer: COMMERCIAL

## 2025-04-16 DIAGNOSIS — R07.89 OTHER CHEST PAIN: ICD-10-CM

## 2025-04-16 PROCEDURE — 75571 CT HRT W/O DYE W/CA TEST: CPT | Mod: 26,,, | Performed by: RADIOLOGY

## 2025-04-16 PROCEDURE — 75571 CT HRT W/O DYE W/CA TEST: CPT | Mod: TC
